# Patient Record
Sex: MALE | Race: WHITE | Employment: FULL TIME | URBAN - NONMETROPOLITAN AREA
[De-identification: names, ages, dates, MRNs, and addresses within clinical notes are randomized per-mention and may not be internally consistent; named-entity substitution may affect disease eponyms.]

---

## 2020-10-24 ENCOUNTER — HOSPITAL ENCOUNTER (EMERGENCY)
Age: 25
Discharge: HOME | End: 2020-10-24
Attending: STUDENT IN AN ORGANIZED HEALTH CARE EDUCATION/TRAINING PROGRAM
Payer: COMMERCIAL

## 2020-10-24 ENCOUNTER — APPOINTMENT (OUTPATIENT)
Dept: GENERAL RADIOLOGY | Age: 25
End: 2020-10-24
Payer: COMMERCIAL

## 2020-10-24 VITALS
RESPIRATION RATE: 18 BRPM | OXYGEN SATURATION: 99 % | TEMPERATURE: 98 F | WEIGHT: 145 LBS | HEART RATE: 87 BPM | BODY MASS INDEX: 21.48 KG/M2 | DIASTOLIC BLOOD PRESSURE: 84 MMHG | HEIGHT: 69 IN | SYSTOLIC BLOOD PRESSURE: 124 MMHG

## 2020-10-24 PROCEDURE — 99283 EMERGENCY DEPT VISIT LOW MDM: CPT

## 2020-10-24 PROCEDURE — 29125 APPL SHORT ARM SPLINT STATIC: CPT

## 2020-10-24 PROCEDURE — 73130 X-RAY EXAM OF HAND: CPT

## 2020-10-24 PROCEDURE — 6370000000 HC RX 637 (ALT 250 FOR IP): Performed by: STUDENT IN AN ORGANIZED HEALTH CARE EDUCATION/TRAINING PROGRAM

## 2020-10-24 RX ORDER — NAPROXEN 250 MG/1
500 TABLET ORAL ONCE
Status: COMPLETED | OUTPATIENT
Start: 2020-10-24 | End: 2020-10-24

## 2020-10-24 RX ORDER — NAPROXEN 500 MG/1
500 TABLET ORAL 2 TIMES DAILY PRN
Qty: 20 TABLET | Refills: 0 | Status: SHIPPED | OUTPATIENT
Start: 2020-10-24 | End: 2020-11-03

## 2020-10-24 RX ORDER — OXYCODONE HYDROCHLORIDE AND ACETAMINOPHEN 5; 325 MG/1; MG/1
1 TABLET ORAL EVERY 8 HOURS PRN
Qty: 9 TABLET | Refills: 0 | Status: SHIPPED | OUTPATIENT
Start: 2020-10-24 | End: 2020-10-27

## 2020-10-24 RX ADMIN — NAPROXEN 500 MG: 250 TABLET ORAL at 21:22

## 2020-10-24 ASSESSMENT — PAIN SCALES - GENERAL
PAINLEVEL_OUTOF10: 2
PAINLEVEL_OUTOF10: 3
PAINLEVEL_OUTOF10: 2
PAINLEVEL_OUTOF10: 3

## 2020-10-24 ASSESSMENT — PAIN DESCRIPTION - PAIN TYPE
TYPE: ACUTE PAIN
TYPE: ACUTE PAIN

## 2020-10-24 ASSESSMENT — PAIN DESCRIPTION - ORIENTATION
ORIENTATION: RIGHT
ORIENTATION: RIGHT

## 2020-10-24 ASSESSMENT — PAIN DESCRIPTION - LOCATION
LOCATION: HAND
LOCATION: HAND

## 2020-10-24 ASSESSMENT — PAIN DESCRIPTION - DESCRIPTORS: DESCRIPTORS: ACHING

## 2020-10-25 NOTE — ED PROVIDER NOTES
[facility]      CHIEF COMPLAINT  No chief complaint on file. HISTORY OF PRESENT ILLNESS  Eloisa Gatica is a 23 y.o. male  who presents to the ED complaining of right hand injury. Eloisa Gatica complains of injury to the right hand, that occurred 1 hour ago, while punching a wooden wall. The pain is moderate, aching and burning, worse with movement, improves with nothing, and does not radiate    Other injuries, abrasions and lacerations are Present with mild abrasions. Upper extremity edema, coolness, and weakness are Absent. Numbness and tingling are Present with numbness of 5th digit    No other complaints, modifying factors or associated symptoms. Denies other injury. Patient is right-hand dominant. I have reviewed the following from the nursing documentation. No past medical history on file. No past surgical history on file. No family history on file.   Social History     Socioeconomic History    Marital status: Not on file     Spouse name: Not on file    Number of children: Not on file    Years of education: Not on file    Highest education level: Not on file   Occupational History    Not on file   Social Needs    Financial resource strain: Not on file    Food insecurity     Worry: Not on file     Inability: Not on file    Transportation needs     Medical: Not on file     Non-medical: Not on file   Tobacco Use    Smoking status: Not on file   Substance and Sexual Activity    Alcohol use: Not on file    Drug use: Not on file    Sexual activity: Not on file   Lifestyle    Physical activity     Days per week: Not on file     Minutes per session: Not on file    Stress: Not on file   Relationships    Social connections     Talks on phone: Not on file     Gets together: Not on file     Attends Muslim service: Not on file     Active member of club or organization: Not on file     Attends meetings of clubs or organizations: Not on file     Relationship status: Not on file    Intimate partner violence     Fear of current or ex partner: Not on file     Emotionally abused: Not on file     Physically abused: Not on file     Forced sexual activity: Not on file   Other Topics Concern    Not on file   Social History Narrative    Not on file     No current facility-administered medications for this encounter. No current outpatient medications on file. Allergies not on file    REVIEW OF SYSTEMS  10 systems reviewed, pertinent positives per HPI otherwise noted to be negative. PHYSICAL EXAM  /86   Pulse 88   Temp 98 °F (36.7 °C) (Oral)   Resp 18   Ht 5' 9\" (1.753 m)   Wt 145 lb (65.8 kg)   SpO2 98%   BMI 21.41 kg/m²    GENERAL APPEARANCE: Awake and alert. Cooperative. No acute distress. HENT: Normocephalic. Atraumatic. Mucous membranes are moist  NECK: Supple. EYES: PERRL. EOM's grossly intact. HEART/CHEST: RRR. No murmurs. LUNGS: Respirations unlabored. CTAB. Good air exchange. Speaking comfortably in full sentences. ABDOMEN: No tenderness. Soft. Non-distended. No masses. No organomegaly. No guarding or rebound. MUSCULOSKELETAL:  There is obvious joint or bony deformity over the proximal fourth and fifth metacarpals. negative joint effusions. right wrist is not tender to palpation. right forearm is not tender to palpation. right elbow is not tender to palpation. right shoulder is not tender to palpation. There is not tenderness over the scaphoid. Capillary refill less than 3 seconds. Pulses 2+ and equal bilaterally. Cardinal movements of the hand intact. Sensation over the median, radial, and ulnar nerves intact, patient reports decreased sensation in his left fifth digit, however he can feel me touching him. NEUROLOGICAL: Awake, alert and oriented x 3. Power intact at the shoulders, elbows, and wrists. Sensation is intact to light touch in the lower extremities.    IMMUNOLOGICAL: No palpable lymphadenopathy or lymphatic streaking. DERMATOLOGIC: No petechiae or rashes. There are not ecchymoses. Theres no cyanosis, erythema, or pallor. There is edema. Skin temperature is normal. No lacerations or abrasions. No evidence of foreign bodies. PSYCHIATRIC: Normal mood and affect. LABS  I have reviewed all labs for this visit. No results found for this visit on 10/24/20. RADIOLOGY    XR HAND RIGHT (MIN 3 VIEWS)   Preliminary Result   Dorsal subluxation/possible dislocation of the 4th and 5th metacarpal bases. There are small associated fracture fragments. ED COURSE  Patient seen and evaluated. Old records reviewed. Labs and imaging reviewed and results discussed with patient. Overall well appearing patient, in no acute distress, presenting for right hand pain after punching a wooden wall. Physical exam remarkable for deformity over the bases of the fourth and fifth metacarpal, tenderness to palpation, reported decreased sensation in fifth digit, otherwise neurovascularly intact. Differential diagnosis includes but is not limited to: Fracture, muscular strain, ligamentous sprain, joint effusion, lower suspicion for gout, septic arthritis    Pain occurred after injury. There is no evidence of joint effusion. Do not suspect gout or septic arthritis. No indication for arthrocentesis at this time. X-ray obtained due to concern for fracture. ED Course as of Oct 24 2334   Sat Oct 24, 2020   2158 FINDINGS:  Dorsal subluxation/possible dislocation of the 4th and 5th metacarpal bases. There are small fracture fragments adjacent to the 5th metacarpal base and at  the radial aspect of the 4th metacarpal base.     IMPRESSION:  Dorsal subluxation/possible dislocation of the 4th and 5th metacarpal bases. There are small associated fracture fragments. [ER]   Torin Glover 85 consulted. [ER]   4860 Spoke to Dr. Ramirez Friend of orthopedics. He stated that patient needed to be placed in an ulnar gutter splint. He did not recommend attempting to reduce the dislocation at this time as they do not remain in place and are very difficult to reduce. He said this was not necessary. He recommended that the patient rest, ice, elevate and remain in the splint until he follows up with hand clinic. He will likely require surgery. [ER]   6981 Placed in splint by medic. Right upper extremity was neurovascularly intact after splint placement. [ER]      ED Course User Index  [ER] Ana María Manzano MD        During the patient's ED course, the patient was given:  Medications   naproxen (NAPROSYN) tablet 500 mg (500 mg Oral Given 10/24/20 2122)      Work-up showed dorsal subluxation and possible dislocation of fourth and fifth metacarpal bases with associated fractures. I discussed this case with orthopedics, who did not recommend attempt at reduction at this time. They recommended ulnar gutter splint and follow-up in orthopedic hand clinic. Given prescriptions for naproxen and oxycodone for breakthrough pain. Counseled to rest, ice, compress and elevate. Patient was placed in ulnar gutter splint. Intact vascular exam after splint placement. I estimate there is LOW risk for COMPARTMENT SYNDROME, DEEP VENOUS THROMBOSIS, TENDON OR NEUROVASCULAR INJURY, thus I consider the discharge disposition reasonable. Monisha Good and I have discussed the diagnosis and risks, and we agree with discharging home to follow-up with their primary doctor or the referral orthopedist. We also discussed returning to the Emergency Department immediately if new or worsening symptoms occur. We have discussed the symptoms which are most concerning (e.g., changing or worsening pain, numbness, weakness) that necessitate immediate return. Patient discharged in stable condition to follow-up with orthopedics on Monday. CLINICAL IMPRESSION  1. Subluxation of proximal end of metacarpal bone, right, initial encounter    2.  Closed fracture of fifth metacarpal bone of left hand, unspecified fracture morphology, initial encounter    3. Closed fracture of fourth metacarpal bone of right hand, unspecified fracture morphology, initial encounter        Blood pressure 124/84, pulse 87, temperature 98 °F (36.7 °C), temperature source Oral, resp. rate 18, height 5' 9\" (1.753 m), weight 145 lb (65.8 kg), SpO2 99 %. Gary Kraus was discharged to home in stable condition. Patient was given scripts for the following medications. I counseled patient how to take these medications. Discharge Medication List as of 10/24/2020 10:48 PM      START taking these medications    Details   naproxen (NAPROSYN) 500 MG tablet Take 1 tablet by mouth 2 times daily as needed for Pain, Disp-20 tablet,R-0Print      oxyCODONE-acetaminophen (PERCOCET) 5-325 MG per tablet Take 1 tablet by mouth every 8 hours as needed for Pain (for breakthrough pain) for up to 3 days. Intended supply: 3 days. Take lowest dose possible to manage pain, Disp-9 tablet,R-0Print           Follow-up with:  Patient instructed to follow-up with hand clinic. A referral was placed to orthopedic hand clinic. Patient is from [place], so patient told to contact an orthopedic hand clinic office on Monday if he does not wish to return to [place]. DISCLAIMER: This chart was created using Dragon dictation software. Efforts were made by me to ensure accuracy, however some errors may be present due to limitations of this technology and occasionally words are not transcribed correctly.      Anthony Boyle MD  10/25/20 0105 Myke Barone MD  10/25/20 5514